# Patient Record
Sex: FEMALE | Race: WHITE | Employment: OTHER | URBAN - METROPOLITAN AREA
[De-identification: names, ages, dates, MRNs, and addresses within clinical notes are randomized per-mention and may not be internally consistent; named-entity substitution may affect disease eponyms.]

---

## 2017-09-15 ENCOUNTER — HOSPITAL ENCOUNTER (EMERGENCY)
Age: 57
Discharge: HOME | End: 2017-09-15
Attending: STUDENT IN AN ORGANIZED HEALTH CARE EDUCATION/TRAINING PROGRAM
Payer: COMMERCIAL

## 2017-09-15 ENCOUNTER — APPOINTMENT (OUTPATIENT)
Dept: GENERAL RADIOLOGY | Age: 57
End: 2017-09-15
Attending: NURSE PRACTITIONER
Payer: COMMERCIAL

## 2017-09-15 VITALS
HEART RATE: 89 BPM | OXYGEN SATURATION: 100 % | DIASTOLIC BLOOD PRESSURE: 95 MMHG | HEIGHT: 62 IN | TEMPERATURE: 97 F | RESPIRATION RATE: 17 BRPM | BODY MASS INDEX: 25.76 KG/M2 | WEIGHT: 140 LBS | SYSTOLIC BLOOD PRESSURE: 126 MMHG

## 2017-09-15 DIAGNOSIS — R07.89 ATYPICAL CHEST PAIN: Primary | ICD-10-CM

## 2017-09-15 DIAGNOSIS — I45.6 WPW (WOLFF-PARKINSON-WHITE SYNDROME): ICD-10-CM

## 2017-09-15 LAB
ALBUMIN SERPL-MCNC: 3.8 G/DL (ref 3.5–5)
ALBUMIN/GLOB SERPL: 1.1 {RATIO} (ref 1.1–2.2)
ALP SERPL-CCNC: 88 U/L (ref 45–117)
ALT SERPL-CCNC: 45 U/L (ref 12–78)
ANION GAP SERPL CALC-SCNC: 2 MMOL/L (ref 5–15)
AST SERPL-CCNC: 27 U/L (ref 15–37)
BASOPHILS # BLD: 0 K/UL (ref 0–0.1)
BASOPHILS NFR BLD: 0 % (ref 0–1)
BILIRUB SERPL-MCNC: 0.4 MG/DL (ref 0.2–1)
BUN SERPL-MCNC: 11 MG/DL (ref 6–20)
BUN/CREAT SERPL: 13 (ref 12–20)
CALCIUM SERPL-MCNC: 8.8 MG/DL (ref 8.5–10.1)
CHLORIDE SERPL-SCNC: 103 MMOL/L (ref 97–108)
CO2 SERPL-SCNC: 33 MMOL/L (ref 21–32)
CREAT SERPL-MCNC: 0.85 MG/DL (ref 0.55–1.02)
D DIMER PPP FEU-MCNC: <0.17 MG/L FEU (ref 0–0.65)
EOSINOPHIL # BLD: 0.1 K/UL (ref 0–0.4)
EOSINOPHIL NFR BLD: 2 % (ref 0–7)
ERYTHROCYTE [DISTWIDTH] IN BLOOD BY AUTOMATED COUNT: 13.2 % (ref 11.5–14.5)
GLOBULIN SER CALC-MCNC: 3.4 G/DL (ref 2–4)
GLUCOSE SERPL-MCNC: 93 MG/DL (ref 65–100)
HCT VFR BLD AUTO: 48 % (ref 35–47)
HGB BLD-MCNC: 16.2 G/DL (ref 11.5–16)
LYMPHOCYTES # BLD: 1.7 K/UL (ref 0.8–3.5)
LYMPHOCYTES NFR BLD: 22 % (ref 12–49)
MAGNESIUM SERPL-MCNC: 2.3 MG/DL (ref 1.6–2.4)
MCH RBC QN AUTO: 32.1 PG (ref 26–34)
MCHC RBC AUTO-ENTMCNC: 33.8 G/DL (ref 30–36.5)
MCV RBC AUTO: 95.2 FL (ref 80–99)
MONOCYTES # BLD: 0.5 K/UL (ref 0–1)
MONOCYTES NFR BLD: 6 % (ref 5–13)
NEUTS SEG # BLD: 5.2 K/UL (ref 1.8–8)
NEUTS SEG NFR BLD: 70 % (ref 32–75)
PLATELET # BLD AUTO: 231 K/UL (ref 150–400)
POTASSIUM SERPL-SCNC: 4.1 MMOL/L (ref 3.5–5.1)
PROT SERPL-MCNC: 7.2 G/DL (ref 6.4–8.2)
RBC # BLD AUTO: 5.04 M/UL (ref 3.8–5.2)
SODIUM SERPL-SCNC: 138 MMOL/L (ref 136–145)
TROPONIN I SERPL-MCNC: <0.04 NG/ML
TROPONIN I SERPL-MCNC: <0.04 NG/ML
WBC # BLD AUTO: 7.5 K/UL (ref 3.6–11)

## 2017-09-15 PROCEDURE — 80053 COMPREHEN METABOLIC PANEL: CPT | Performed by: NURSE PRACTITIONER

## 2017-09-15 PROCEDURE — 83735 ASSAY OF MAGNESIUM: CPT | Performed by: NURSE PRACTITIONER

## 2017-09-15 PROCEDURE — 84484 ASSAY OF TROPONIN QUANT: CPT | Performed by: NURSE PRACTITIONER

## 2017-09-15 PROCEDURE — 99285 EMERGENCY DEPT VISIT HI MDM: CPT

## 2017-09-15 PROCEDURE — 36415 COLL VENOUS BLD VENIPUNCTURE: CPT | Performed by: NURSE PRACTITIONER

## 2017-09-15 PROCEDURE — 71020 XR CHEST PA LAT: CPT

## 2017-09-15 PROCEDURE — 85025 COMPLETE CBC W/AUTO DIFF WBC: CPT | Performed by: NURSE PRACTITIONER

## 2017-09-15 PROCEDURE — 96374 THER/PROPH/DIAG INJ IV PUSH: CPT

## 2017-09-15 PROCEDURE — 85379 FIBRIN DEGRADATION QUANT: CPT | Performed by: NURSE PRACTITIONER

## 2017-09-15 PROCEDURE — 74011250636 HC RX REV CODE- 250/636: Performed by: NURSE PRACTITIONER

## 2017-09-15 RX ORDER — CITALOPRAM 20 MG/1
20 TABLET, FILM COATED ORAL DAILY
COMMUNITY

## 2017-09-15 RX ORDER — HYDROCODONE BITARTRATE AND ACETAMINOPHEN 5; 325 MG/1; MG/1
1 TABLET ORAL
Qty: 10 TAB | Refills: 0 | Status: SHIPPED | OUTPATIENT
Start: 2017-09-15 | End: 2017-09-28

## 2017-09-15 RX ORDER — SODIUM CHLORIDE 0.9 % (FLUSH) 0.9 %
5-10 SYRINGE (ML) INJECTION AS NEEDED
Status: DISCONTINUED | OUTPATIENT
Start: 2017-09-15 | End: 2017-09-15 | Stop reason: HOSPADM

## 2017-09-15 RX ORDER — LANSOPRAZOLE 30 MG/1
30 TABLET, ORALLY DISINTEGRATING, DELAYED RELEASE ORAL DAILY
COMMUNITY

## 2017-09-15 RX ORDER — MULTIVITAMIN
1 TABLET ORAL DAILY
COMMUNITY

## 2017-09-15 RX ORDER — BISMUTH SUBSALICYLATE 262 MG
1 TABLET,CHEWABLE ORAL DAILY
COMMUNITY

## 2017-09-15 RX ORDER — SIMVASTATIN 20 MG/1
20 TABLET, FILM COATED ORAL
COMMUNITY

## 2017-09-15 RX ORDER — MORPHINE SULFATE 4 MG/ML
4 INJECTION, SOLUTION INTRAMUSCULAR; INTRAVENOUS
Status: COMPLETED | OUTPATIENT
Start: 2017-09-15 | End: 2017-09-15

## 2017-09-15 RX ORDER — ONDANSETRON 2 MG/ML
4 INJECTION INTRAMUSCULAR; INTRAVENOUS
Status: DISCONTINUED | OUTPATIENT
Start: 2017-09-15 | End: 2017-09-15 | Stop reason: HOSPADM

## 2017-09-15 RX ADMIN — Medication 4 MG: at 13:52

## 2017-09-15 NOTE — ED NOTES
Pt discharged by provider. Pt escorted off the unit with a w/c and in NAD. Pt home with spouse who will be driving.

## 2017-09-15 NOTE — ED TRIAGE NOTES
4 episodes of chest pain this am.  Now experiencing pain in right jaw. Pt reports shortness of breath. Weak and tired x3 days.

## 2017-09-15 NOTE — DISCHARGE INSTRUCTIONS
Chest Pain: Care Instructions  Your Care Instructions  There are many things that can cause chest pain. Some are not serious and will get better on their own in a few days. But some kinds of chest pain need more testing and treatment. Your doctor may have recommended a follow-up visit in the next 8 to 12 hours. If you are not getting better, you may need more tests or treatment. Even though your doctor has released you, you still need to watch for any problems. The doctor carefully checked you, but sometimes problems can develop later. If you have new symptoms or if your symptoms do not get better, get medical care right away. If you have worse or different chest pain or pressure that lasts more than 5 minutes or you passed out (lost consciousness), call 911 or seek other emergency help right away. A medical visit is only one step in your treatment. Even if you feel better, you still need to do what your doctor recommends, such as going to all suggested follow-up appointments and taking medicines exactly as directed. This will help you recover and help prevent future problems. How can you care for yourself at home? · Rest until you feel better. · Take your medicine exactly as prescribed. Call your doctor if you think you are having a problem with your medicine. · Do not drive after taking a prescription pain medicine. When should you call for help? Call 911 if:  · You passed out (lost consciousness). · You have severe difficulty breathing. · You have symptoms of a heart attack. These may include:  ¨ Chest pain or pressure, or a strange feeling in your chest.  ¨ Sweating. ¨ Shortness of breath. ¨ Nausea or vomiting. ¨ Pain, pressure, or a strange feeling in your back, neck, jaw, or upper belly or in one or both shoulders or arms. ¨ Lightheadedness or sudden weakness. ¨ A fast or irregular heartbeat.   After you call 911, the  may tell you to chew 1 adult-strength or 2 to 4 low-dose aspirin. Wait for an ambulance. Do not try to drive yourself. Call your doctor today if:  · You have any trouble breathing. · Your chest pain gets worse. · You are dizzy or lightheaded, or you feel like you may faint. · You are not getting better as expected. · You are having new or different chest pain. Where can you learn more? Go to http://justine-brody.info/. Enter A120 in the search box to learn more about \"Chest Pain: Care Instructions. \"  Current as of: March 20, 2017  Content Version: 11.3  © 3997-0144 [employer]. Care instructions adapted under license by [employer] (which disclaims liability or warranty for this information). If you have questions about a medical condition or this instruction, always ask your healthcare professional. [address] any warranty or liability for your use of this information. Giulia-Parkinson-White (WPW) Syndrome: Care Instructions  Your Care Instructions    Giulia-Parkinson-White (WPW) syndrome is a heart rhythm problem that causes a very fast heart rate. It happens because you have an extra electrical pathway in your heart. WPW is a congenital heart problem. This means you were born with the problem. You may have a fast heart rate or feel a fluttering in your chest (palpitations), feel lightheaded or dizzy, or faint. When you have these symptoms, it is called an episode. You may never have an episode, rarely have one, or have one once or twice a week. Very rarely, a WPW episode can trigger a heart rhythm that can cause death. Your doctor may prescribe medicines to help slow down your heartbeat. Your doctor may also suggest you try vagal maneuvers when having an episode of WPW. These are things, like bearing down, that might help slow your heart rate. Bearing down means that you try to breathe out with your stomach muscles but you don't let air out of your nose or mouth.  Your doctor can show you how to do vagal maneuvers. He or she may suggest that you lie down on your back to do them. In some cases, a procedure called catheter ablation is done. Follow-up care is a key part of your treatment and safety. Be sure to make and go to all appointments, and call your doctor if you are having problems. It's also a good idea to know your test results and keep a list of the medicines you take. How can you care for yourself at home? · Take your medicines exactly as prescribed. Call your doctor if you think you are having a problem with your medicine. You will get more details on the specific medicines your doctor prescribes. · If your doctor showed you how to do vagal maneuvers, try them when you have an episode. These maneuvers include bearing down or putting an ice-cold, wet towel on your face. · Monitor your condition by keeping a diary of your WPW episodes. Bring this to your doctor appointments. First, you'll need to count your heart rate (take your pulse). · After you check your heart rate, write down:  ¨ How fast or slow your heart was beating. ¨ If your heart rhythm was regular or irregular. ¨ What symptoms you had. ¨ The time of day your symptoms occurred. ¨ How long your symptoms lasted. ¨ What you were doing when your symptoms started. ¨ What may have helped your symptoms go away. · If they trigger episodes, limit or avoid alcohol or drinks with caffeine. · Do not use over-the-counter decongestants, diet pills, or \"pep\" pills. They often contain ingredients that make your heart beat faster (stimulants). · Do not use illegal drugs, such as cocaine, ecstasy, or methamphetamine, which can speed up your heart's rhythm. · Do not smoke. Smoking can make this condition worse. If you need help quitting, talk to your doctor about stop-smoking programs and medicines. These can increase your chances of quitting for good. When should you call for help?   Call 911anytime you think you may need emergency care. For example, call if:  · You passed out (lost consciousness). · You have fluttering in your chest (palpitations) or a fast heartbeat that does not stop quickly. · You have symptoms of a heart attack. These may include:  ¨ Chest pain or pressure, or a strange feeling in the chest.  ¨ Sweating. ¨ Shortness of breath. ¨ Nausea or vomiting. ¨ Pain, pressure, or a strange feeling in the back, neck, jaw, or upper belly or in one or both shoulders or arms. ¨ Lightheadedness or a sudden weakness. ¨ A fast or irregular heartbeat. After you call 911, the  may tell you to chew 1 adult-strength or 2 to 4 low-dose aspirin. Wait for an ambulance. Do not try to drive yourself. Call your doctor now or seek immediate medical care if:  · You had fluttering in the chest (palpitations) or a fast heartbeat that stopped on its own. · You are dizzy or lightheaded, or you feel like you may faint. Watch closely for changes in your health, and be sure to contact your doctor if:  · You do not get better as expected. Where can you learn more? Go to http://justine-brody.info/. Enter B246 in the search box to learn more about \"Giulia-Parkinson-White (WPW) Syndrome: Care Instructions. \"  Current as of: April 26, 2016  Content Version: 11.3  © 6512-0336 [employer]. Care instructions adapted under license by [employer] (which disclaims liability or warranty for this information). If you have questions about a medical condition or this instruction, always ask your healthcare professional. [place] [address] any warranty or liability for your use of this information. We hope that we have addressed all of your medical concerns. The examination and treatment you received in the Emergency Department were for an emergent problem and were not intended as complete care. It is important that you follow up with your healthcare provider(s) for ongoing care.  If your symptoms worsen or do not improve as expected, and you are unable to reach your usual health care provider(s), you should return to the Emergency Department. Today's healthcare is undergoing tremendous change, and patient satisfaction surveys are one of the many tools to assess the quality of medical care. You may receive a survey from the [facility] regarding your experience in the Emergency Department. I hope that your experience has been completely positive, particularly the medical care that I provided. As such, please participate in the survey; anything less than excellent does not meet my expectations or intentions. [facility] and [employer] participate in nationally recognized quality of care measures. If your blood pressure is greater than 120/80, as reported below, we urge that you seek medical care to address the potential of high blood pressure, commonly known as hypertension. Hypertension can be hereditary or can be caused by certain medical conditions, pain, stress, or \"white coat syndrome. \"       Please make an appointment with your health care provider(s) for follow up of your Emergency Department visit. VITALS:   Patient Vitals for the past 8 hrs:   Temp Pulse Resp BP SpO2   09/15/17 1330 - 79 19 (!) 126/96 97 %   09/15/17 1324 - 80 19 (!) 120/93 97 %   09/15/17 1230 - 85 18 (!) 127/95 98 %   09/15/17 1200 - 84 17 121/87 99 %   09/15/17 1145 - 85 16 116/84 97 %   09/15/17 1130 - 86 21 (!) 128/96 99 %   09/15/17 1115 - 89 12 (!) 137/93 99 %   09/15/17 1059 97 °F (36.1 °C) 92 20 (!) 143/94 99 %          Thank you for allowing us to provide you with medical care today. We realize that you have many choices for your emergency care needs. Please choose us in the future for any continued health care needs. Wally Hartman, [address] [facility].   Office: [phone]            Recent Results (from the past 24 hour(s))   METABOLIC PANEL, COMPREHENSIVE    Collection Time: 09/15/17 11:17 AM   Result Value Ref Range    Sodium 138 136 - 145 mmol/L    Potassium 4.1 3.5 - 5.1 mmol/L    Chloride 103 97 - 108 mmol/L    CO2 33 (H) 21 - 32 mmol/L    Anion gap 2 (L) 5 - 15 mmol/L    Glucose 93 65 - 100 mg/dL    BUN 11 6 - 20 MG/DL    Creatinine 0.85 0.55 - 1.02 MG/DL    BUN/Creatinine ratio 13 12 - 20      GFR est AA >60 >60 ml/min/1.73m2    GFR est non-AA >60 >60 ml/min/1.73m2    Calcium 8.8 8.5 - 10.1 MG/DL    Bilirubin, total 0.4 0.2 - 1.0 MG/DL    ALT (SGPT) 45 12 - 78 U/L    AST (SGOT) 27 15 - 37 U/L    Alk. phosphatase 88 45 - 117 U/L    Protein, total 7.2 6.4 - 8.2 g/dL    Albumin 3.8 3.5 - 5.0 g/dL    Globulin 3.4 2.0 - 4.0 g/dL    A-G Ratio 1.1 1.1 - 2.2     CBC WITH AUTOMATED DIFF    Collection Time: 09/15/17 11:17 AM   Result Value Ref Range    WBC 7.5 3.6 - 11.0 K/uL    RBC 5.04 3.80 - 5.20 M/uL    HGB 16.2 (H) 11.5 - 16.0 g/dL    HCT 48.0 (H) 35.0 - 47.0 %    MCV 95.2 80.0 - 99.0 FL    MCH 32.1 26.0 - 34.0 PG    MCHC 33.8 30.0 - 36.5 g/dL    RDW 13.2 11.5 - 14.5 %    PLATELET 573 132 - 100 K/uL    NEUTROPHILS 70 32 - 75 %    LYMPHOCYTES 22 12 - 49 %    MONOCYTES 6 5 - 13 %    EOSINOPHILS 2 0 - 7 %    BASOPHILS 0 0 - 1 %    ABS. NEUTROPHILS 5.2 1.8 - 8.0 K/UL    ABS. LYMPHOCYTES 1.7 0.8 - 3.5 K/UL    ABS. MONOCYTES 0.5 0.0 - 1.0 K/UL    ABS. EOSINOPHILS 0.1 0.0 - 0.4 K/UL    ABS.  BASOPHILS 0.0 0.0 - 0.1 K/UL   TROPONIN I    Collection Time: 09/15/17 11:17 AM   Result Value Ref Range    Troponin-I, Qt. <0.04 <0.05 ng/mL   MAGNESIUM    Collection Time: 09/15/17 11:17 AM   Result Value Ref Range    Magnesium 2.3 1.6 - 2.4 mg/dL   D DIMER    Collection Time: 09/15/17 11:17 AM   Result Value Ref Range    D-dimer <0.17 0.00 - 0.65 mg/L FEU   TROPONIN I    Collection Time: 09/15/17  1:22 PM   Result Value Ref Range    Troponin-I, Qt. <0.04 <0.05 ng/mL       Xr Chest Pa Lat    Result Date: 9/15/2017  Chest PA and lateral History: Chest pain. Pain in right jaw. Comparison: None Findings: The lungs are well expanded. A calcified granuloma is seen along the left hemidiaphragm. No focal consolidation, pleural effusion, or pneumothorax. The cardiomediastinal silhouette is unremarkable. The visualized osseous structures are unremarkable. Impression: No acute cardiopulmonary process.

## 2017-09-15 NOTE — ED PROVIDER NOTES
HPI Comments: The patient is a 56-year-old female who presents ambulatory to the emergency room with chest pain for the last 4 hours. History of WPW diagnosed 10 years ago in the emergency room although this was never followed up. Chest pain began spontaneously substernally. It is not exacerbated by pain. It is not associated with diaphoresis or shortness of breath. Non exertional chest pain. Patient reports breathing quickly as she is panicked. History of significant anxiety. History of GERD on PPI. Pt denies fevers, chills, night sweats, SOB, FOX, PND, orthopnea, abdominal pain, n/v/d, melena, hematuria, dysuria, constipation, HA, dizziness, and syncope      Past Medical History:  No date: Depression  No date: gerd  No date: Hypercholesteremia  No date: Giulia-Parkinson-White (WPW) syndrome    Past Surgical History:  No date: HX APPENDECTOMY  No date: HX HYSTERECTOMY    PCP:  Randy Paul MD      Patient is a 65 y.o. female presenting with chest pain. The history is provided by the patient. Chest Pain (Angina)    This is a new problem. Pertinent negatives include no abdominal pain, no back pain, no cough, no diaphoresis, no dizziness, no fever, no headaches, no nausea, no palpitations, no shortness of breath, no vomiting and no weakness. Past Medical History:   Diagnosis Date    Depression     gerd     Hypercholesteremia     Giulia-Parkinson-White (WPW) syndrome        Past Surgical History:   Procedure Laterality Date    HX APPENDECTOMY      HX HYSTERECTOMY           History reviewed. No pertinent family history. Social History     Social History    Marital status:      Spouse name: N/A    Number of children: N/A    Years of education: N/A     Occupational History    Not on file.      Social History Main Topics    Smoking status: Former Smoker    Smokeless tobacco: Never Used    Alcohol use 1.2 oz/week     2 Glasses of wine per week    Drug use: No    Sexual activity: Not on file     Other Topics Concern    Not on file     Social History Narrative    No narrative on file         ALLERGIES: Review of patient's allergies indicates no known allergies. Review of Systems   Constitutional: Negative for activity change, appetite change, chills, diaphoresis, fatigue, fever and unexpected weight change. HENT: Negative for congestion, ear pain, rhinorrhea, sinus pressure, sore throat and tinnitus. Eyes: Negative for photophobia, pain, discharge and visual disturbance. Respiratory: Negative for apnea, cough, choking, chest tightness, shortness of breath, wheezing and stridor. Cardiovascular: Positive for chest pain. Negative for palpitations and leg swelling. Gastrointestinal: Negative for abdominal pain, constipation, diarrhea, nausea and vomiting. Endocrine: Negative for polydipsia, polyphagia and polyuria. Genitourinary: Negative for decreased urine volume, dyspareunia, dysuria, enuresis, flank pain, frequency, hematuria and urgency. Musculoskeletal: Negative for arthralgias, back pain, gait problem, myalgias and neck pain. Skin: Negative for color change, pallor, rash and wound. Allergic/Immunologic: Negative for immunocompromised state. Neurological: Negative for dizziness, seizures, syncope, weakness, light-headedness and headaches. Hematological: Does not bruise/bleed easily. Psychiatric/Behavioral: Negative for agitation and confusion. The patient is not nervous/anxious. Vitals:    09/15/17 1230 09/15/17 1324 09/15/17 1330 09/15/17 1400   BP: (!) 127/95 (!) 120/93 (!) 126/96 (!) 126/95   Pulse: 85 80 79 89   Resp: 18 19 19 17   Temp:       SpO2: 98% 97% 97% 100%   Weight:       Height:                Physical Exam   Constitutional: She is oriented to person, place, and time. She appears well-developed and well-nourished. No distress. HENT:   Head: Normocephalic.    Right Ear: External ear normal.   Left Ear: External ear normal.   Mouth/Throat: Oropharynx is clear and moist. No oropharyngeal exudate. Eyes: Conjunctivae and EOM are normal. Pupils are equal, round, and reactive to light. Right eye exhibits no discharge. Left eye exhibits no discharge. No scleral icterus. Neck: Normal range of motion. Neck supple. No JVD present. No tracheal deviation present. No thyromegaly present. Cardiovascular: Normal rate, regular rhythm, normal heart sounds, intact distal pulses and normal pulses. PMI is not displaced. Exam reveals no gallop and no friction rub. No murmur heard. Pulmonary/Chest: Effort normal and breath sounds normal. No accessory muscle usage or stridor. No respiratory distress. She has no decreased breath sounds. She has no wheezes. She has no rhonchi. She has no rales. She exhibits no tenderness. Abdominal: Soft. Bowel sounds are normal. She exhibits no distension and no mass. There is no hepatosplenomegaly. There is no tenderness. There is no rigidity, no rebound, no guarding, no CVA tenderness, no tenderness at McBurney's point and negative Miguel's sign. Musculoskeletal: Normal range of motion. She exhibits no edema or tenderness. Lymphadenopathy:     She has no cervical adenopathy. Neurological: She is alert and oriented to person, place, and time. She has normal strength. She displays normal reflexes. No cranial nerve deficit or sensory deficit. GCS eye subscore is 4. GCS verbal subscore is 5. GCS motor subscore is 6. Skin: Skin is warm and dry. No rash noted. She is not diaphoretic. No erythema. No pallor. Psychiatric: She has a normal mood and affect. Her behavior is normal. Judgment and thought content normal.   Nursing note and vitals reviewed.        MDM  Number of Diagnoses or Management Options  Diagnosis management comments:    * routine laboratory data    * CXR   * Analgesia          Amount and/or Complexity of Data Reviewed  Clinical lab tests: ordered and reviewed  Tests in the radiology section of CPT®: ordered and reviewed  Discussion of test results with the performing providers: yes  Review and summarize past medical records: yes  Discuss the patient with other providers: yes    Risk of Complications, Morbidity, and/or Mortality  General comments:    - stable, ambulatory pt in NAD    Patient Progress  Patient progress: stable    ED Course       Procedures      ED EKG interpretation:  Rhythm: normal sinus rhythm; and regular . Rate (approx.): 91; Axis: normal; P wave: normal; QRS interval: normal ; ST/T wave: normal; in  Lead: WPW. This EKG was interpreted by Celestine Matos NP,ED Provider. 5:02 PM  Pt has been reevaluated. There are no new complaints, changes, or physical findings at this time. Medications have been reviewed w/ pt and/or family. Pt and/or family's questions have been answered. Pt and/or family expressed good understanding of the dx/tx/rx and is in agreement with plan of care. Pt instructed and agreed to f/u w/ PCP and Cardiology and to return to ED upon further deterioration. Pt is ready for discharge. LABORATORY TESTS:  Recent Results (from the past 12 hour(s))   METABOLIC PANEL, COMPREHENSIVE    Collection Time: 09/15/17 11:17 AM   Result Value Ref Range    Sodium 138 136 - 145 mmol/L    Potassium 4.1 3.5 - 5.1 mmol/L    Chloride 103 97 - 108 mmol/L    CO2 33 (H) 21 - 32 mmol/L    Anion gap 2 (L) 5 - 15 mmol/L    Glucose 93 65 - 100 mg/dL    BUN 11 6 - 20 MG/DL    Creatinine 0.85 0.55 - 1.02 MG/DL    BUN/Creatinine ratio 13 12 - 20      GFR est AA >60 >60 ml/min/1.73m2    GFR est non-AA >60 >60 ml/min/1.73m2    Calcium 8.8 8.5 - 10.1 MG/DL    Bilirubin, total 0.4 0.2 - 1.0 MG/DL    ALT (SGPT) 45 12 - 78 U/L    AST (SGOT) 27 15 - 37 U/L    Alk.  phosphatase 88 45 - 117 U/L    Protein, total 7.2 6.4 - 8.2 g/dL    Albumin 3.8 3.5 - 5.0 g/dL    Globulin 3.4 2.0 - 4.0 g/dL    A-G Ratio 1.1 1.1 - 2.2     CBC WITH AUTOMATED DIFF    Collection Time: 09/15/17 11:17 AM   Result Value Ref Range    WBC 7.5 3.6 - 11.0 K/uL    RBC 5.04 3.80 - 5.20 M/uL    HGB 16.2 (H) 11.5 - 16.0 g/dL    HCT 48.0 (H) 35.0 - 47.0 %    MCV 95.2 80.0 - 99.0 FL    MCH 32.1 26.0 - 34.0 PG    MCHC 33.8 30.0 - 36.5 g/dL    RDW 13.2 11.5 - 14.5 %    PLATELET 092 742 - 377 K/uL    NEUTROPHILS 70 32 - 75 %    LYMPHOCYTES 22 12 - 49 %    MONOCYTES 6 5 - 13 %    EOSINOPHILS 2 0 - 7 %    BASOPHILS 0 0 - 1 %    ABS. NEUTROPHILS 5.2 1.8 - 8.0 K/UL    ABS. LYMPHOCYTES 1.7 0.8 - 3.5 K/UL    ABS. MONOCYTES 0.5 0.0 - 1.0 K/UL    ABS. EOSINOPHILS 0.1 0.0 - 0.4 K/UL    ABS. BASOPHILS 0.0 0.0 - 0.1 K/UL   TROPONIN I    Collection Time: 09/15/17 11:17 AM   Result Value Ref Range    Troponin-I, Qt. <0.04 <0.05 ng/mL   MAGNESIUM    Collection Time: 09/15/17 11:17 AM   Result Value Ref Range    Magnesium 2.3 1.6 - 2.4 mg/dL   D DIMER    Collection Time: 09/15/17 11:17 AM   Result Value Ref Range    D-dimer <0.17 0.00 - 0.65 mg/L FEU   TROPONIN I    Collection Time: 09/15/17  1:22 PM   Result Value Ref Range    Troponin-I, Qt. <0.04 <0.05 ng/mL       IMAGING RESULTS:  XR CHEST PA LAT   Final Result        Xr Chest Pa Lat    Result Date: 9/15/2017  Chest PA and lateral History: Chest pain. Pain in right jaw. Comparison: None Findings: The lungs are well expanded. A calcified granuloma is seen along the left hemidiaphragm. No focal consolidation, pleural effusion, or pneumothorax. The cardiomediastinal silhouette is unremarkable. The visualized osseous structures are unremarkable. Impression: No acute cardiopulmonary process. MEDICATIONS GIVEN:  Medications   sodium chloride (NS) flush 5-10 mL (not administered)   ondansetron (ZOFRAN) injection 4 mg (4 mg IntraVENous Refused 9/15/17 1040)   morphine injection 4 mg (4 mg IntraVENous Given 9/15/17 1352)       IMPRESSION:  1. Atypical chest pain    2. WPW (Giulia-Parkinson-White syndrome)        PLAN:  1.    Discharge Medication List as of 9/15/2017  2:15 PM      START taking these medications    Details   HYDROcodone-acetaminophen (NORCO) 5-325 mg per tablet Take 1 Tab by mouth every four (4) hours as needed for Pain. Max Daily Amount: 6 Tabs., Print, Disp-10 Tab, R-0         CONTINUE these medications which have NOT CHANGED    Details   simvastatin (ZOCOR) 20 mg tablet Take 20 mg by mouth nightly., Historical Med      citalopram (CELEXA) 20 mg tablet Take 20 mg by mouth daily. , Historical Med      lansoprazole (PREVACID SOLUTAB) 30 mg disintegrating tablet Take 30 mg by mouth daily. , Historical Med      multivitamin (ONE A DAY) tablet Take 1 Tab by mouth daily. , Historical Med      calcium-cholecalciferol, D3, (CALTRATE 600+D) tablet Take 1 Tab by mouth daily. , Historical Med           2. Follow-up Information     Follow up With Details Comments Contact Info    Juan Jose Cotton MD In 3 days  [address] [place] [address] [facility] Maria Ines Mujica MD In 3 days  [address] [place] [address]  [phone]      [facility] EMERGENCY DEPT  As needed, If symptoms worsen [address] [facility]  [phone]        3.  Supportive care     Return to ED if worse       Cassie Fishman NP  5:02 PM

## 2017-09-15 NOTE — ED NOTES
Pt is holding pressure to sternum. Also c/o headache developing. Declined morphine. States she does not like it. Denies nausea. States if headache gets worse she may take morphine.

## 2017-09-28 ENCOUNTER — OFFICE VISIT (OUTPATIENT)
Dept: CARDIOLOGY CLINIC | Age: 57
End: 2017-09-28

## 2017-09-28 VITALS
BODY MASS INDEX: 26.46 KG/M2 | SYSTOLIC BLOOD PRESSURE: 134 MMHG | HEIGHT: 62 IN | RESPIRATION RATE: 16 BRPM | DIASTOLIC BLOOD PRESSURE: 90 MMHG | WEIGHT: 143.8 LBS | HEART RATE: 84 BPM | OXYGEN SATURATION: 99 %

## 2017-09-28 DIAGNOSIS — R07.9 CHEST PAIN, UNSPECIFIED TYPE: Primary | ICD-10-CM

## 2017-09-28 DIAGNOSIS — I45.6 WOLFF-PARKINSON-WHITE SYNDROME: ICD-10-CM

## 2017-09-28 DIAGNOSIS — R00.2 PALPITATIONS: ICD-10-CM

## 2017-09-28 RX ORDER — CYCLOBENZAPRINE HCL 10 MG
TABLET ORAL AS NEEDED
COMMUNITY

## 2017-09-28 NOTE — MR AVS SNAPSHOT
Visit Information Date & Time Provider Department Dept. Phone Encounter #  
 9/28/2017 10:40 AM Alley Mcgraw MD [facility] Sheryle Pesa [phone] [id number] Upcoming Health Maintenance Date Due Hepatitis C Screening 1960 DTaP/Tdap/Td series (1 - Tdap) 1/26/1982 PAP AKA CERVICAL CYTOLOGY 1/26/1982 BREAST CANCER SCRN MAMMOGRAM 1/26/2011 FOBT Q 1 YEAR AGE 50-75 1/26/2011 INFLUENZA AGE 9 TO ADULT 8/1/2017 Allergies as of 9/28/2017  Review Complete On: 9/28/2017 By: Jair Stone No Known Allergies Current Immunizations  Never Reviewed No immunizations on file. Not reviewed this visit Vitals BP Pulse Resp Height(growth percentile) Weight(growth percentile) SpO2  
 134/90 (BP 1 Location: Left arm) 84 16 5' 2\" (1.575 m) 143 lb 12.8 oz (65.2 kg) 99% BMI OB Status Smoking Status 26.3 kg/m2 Hysterectomy Former Smoker Vitals History BMI and BSA Data Body Mass Index Body Surface Area  
 26.3 kg/m 2 1.69 m 2 Your Updated Medication List  
  
   
This list is accurate as of: 9/28/17 11:29 AM.  Always use your most recent med list.  
  
  
  
  
 calcium-cholecalciferol (D3) tablet Commonly known as:  CALTRATE 600+D Take 1 Tab by mouth daily. citalopram 20 mg tablet Commonly known as:  Monna Roch Take 20 mg by mouth daily. cyclobenzaprine 10 mg tablet Commonly known as:  FLEXERIL Take  by mouth as needed for Muscle Spasm(s).  
  
 lansoprazole 30 mg disintegrating tablet Commonly known as:  Henrey Cue Take 30 mg by mouth daily. multivitamin tablet Commonly known as:  ONE A DAY Take 1 Tab by mouth daily. simvastatin 20 mg tablet Commonly known as:  ZOCOR Take 20 mg by mouth nightly. Introducing [facility]!    
 [facility] introduces [employer] patient portal. Now you can access parts of your medical record, email your doctor's office, and request medication refills online. 1. In your internet browser, go to [email or web address] 2. Click on the First Time User? Click Here link in the Sign In box. You will see the New Member Sign Up page. 3. Enter your [facility] Access Code exactly as it appears below. You will not need to use this code after youve completed the sign-up process. If you do not sign up before the expiration date, you must request a new code. · [facility] Access Code: [email or web address] Expires: 12/14/2017  2:15 PM 
 
4. Enter the last four digits of your Social Security Number (xxxx) and Date of Birth (mm/dd/yyyy) as indicated and click Submit. You will be taken to the next sign-up page. 5. Create a [facility] ID. This will be your [facility] login ID and cannot be changed, so think of one that is secure and easy to remember. 6. Create a [facility] password. You can change your password at any time. 7. Enter your Password Reset Question and Answer. This can be used at a later time if you forget your password. 8. Enter your e-mail address. You will receive e-mail notification when new information is available in [address] 9. Click Sign Up. You can now view and download portions of your medical record. 10. Click the Download Summary menu link to download a portable copy of your medical information. If you have questions, please visit the Frequently Asked Questions section of the [facility] website. Remember, [facility] is NOT to be used for urgent needs. For medical emergencies, dial 911. Now available from your iPhone and Android! Please provide this summary of care documentation to your next provider. Your primary care clinician is listed as Kamran Sanchez. If you have any questions after today's visit, please call [phone].

## 2017-09-28 NOTE — LETTER
9/28/2017 11:31 AM 
 
Patient:  Celestino aWy YOB: 1960 Date of Visit: 9/28/2017 Dear Dr. Melodie Samuels MD 
[address] [place] [address] [facility] [address] VIA Facsimile: [phone] 
 : Thank you for referring Ms. Celestino Way to me for evaluation/treatment. Below are the relevant portions of my assessment and plan of care. Dear Dr Isabel Bello, 
 
I had the pleasure of seeing Ms Celestino Way in the office today. Assessment: 
Recent ED visit for palpitations/chest pain. History of Leal Balm White syndrome diagnosed in 1995. No symptoms until recently. In 1995 she was having palpitations and was told that she had Giulia-Parkinson-White syndrome. Depression GERD Plan:  
Echocardiogram. 
Stress nuclear study. Referral to  -explore options regarding her Giulia-Parkinson-White syndrome Consider checking TSH if not checked recently. Follow-up with me in 6 months or earlier based on cardiac workup. If you have questions, please do not hesitate to call me. I look forward to following Ms. Mychal Epstein along with you. Sincerely, Ren Mayo MD

## 2017-09-28 NOTE — PROGRESS NOTES
Vanessa Kolb MD    [address] [place], [address] [facility]    Office [phone],[facility] [place], [place] [phone]  Pager [phone] Deisi Prabhakar is a 65 y.o. female referred for evaluation of palpitations . Consult requested by Candy Andrade MD        Primary care physician:  Candy Andrade MD  Chief complaint:  Mattie Green is a 65 y.o. female who complains of   Chief Complaint   Patient presents with   Al Mullins     Dear Dr Justin Cohen,    I had the pleasure of seeing Ms Mattie Green in the office today. Assessment:  Recent ED visit for palpitations/chest pain. History of Esdras Presume White syndrome diagnosed in 1995. No symptoms until recently. In 1995 she was having palpitations and was told that she had Giulia-Parkinson-White syndrome. Depression  GERD    Plan:   Echocardiogram.  Stress nuclear study. Referral to  -explore options regarding her Giulia-Parkinson-White syndrome  Consider checking TSH if not checked recently. Follow-up with me in 6 months or earlier based on cardiac workup. Patient understands the plan. All questions were answered to the patient's satisfaction. Medication Side Effects and Warnings were discussed with patient: yes  Patient Labs were reviewed and or requested:  yes  Patient Past Records were reviewed and or requested: yes    I appreciate the opportunity to be involved in Ms. Venegas. Please see note below for details. Please do not hesitate to contact us with questions or concerns. Vanessa Kolb MD    Cardiac Testing/ Procedures: A. Cardiac Cath/PCI:    B.ECHO/HAYDER:    C.StressNuclear/Stress ECHO/Stress test:    D.Vascular:    E. EP:    F. Miscellaneous:    History of present illness:    Patient is a pleasant 52-year-old  lady with history of Giulia-Parkinson-White syndrome who recently went to the emergency room for chest pain. Chest pain occurred while she was sitting at her desk.   It was associated with palpitations. Dizziness present. No syncope. In the ED her heart rate was within normal limits and she was advised to follow with cardiology. No recurrent symptoms since the ED visit. No swelling lower extremities. Has significant stressors at [employer] in the [employer]. History of Shai Riedel White syndrome diagnosed in 1995. No symptoms until recently. In 1995 she was having palpitations and was told that she had Giulia-Parkinson-White syndrome. Past Medical History:   Diagnosis Date    Depression     gerd     Hypercholesteremia     Giulia-Parkinson-White (WPW) syndrome       Past Surgical History:   Procedure Laterality Date    HX APPENDECTOMY      HX HYSTERECTOMY       Family History   Problem Relation Age of Onset    Coronary Artery Disease Mother      Stent      Social History   Substance Use Topics    Smoking status: Former Smoker    Smokeless tobacco: Never Used      Comment: Quit 2 months ago; 40+ years of smoking    Alcohol use 1.2 oz/week     2 Glasses of wine per week          Medications before admission:    Current Outpatient Prescriptions   Medication Sig Dispense    cyclobenzaprine (FLEXERIL) 10 mg tablet Take  by mouth as needed for Muscle Spasm(s).  simvastatin (ZOCOR) 20 mg tablet Take 20 mg by mouth nightly.  citalopram (CELEXA) 20 mg tablet Take 20 mg by mouth daily.  lansoprazole (PREVACID SOLUTAB) 30 mg disintegrating tablet Take 30 mg by mouth daily.  multivitamin (ONE A DAY) tablet Take 1 Tab by mouth daily.  calcium-cholecalciferol, D3, (CALTRATE 600+D) tablet Take 1 Tab by mouth daily. No current facility-administered medications for this visit.             Review of Systems:  (bold if positive, if negative)    Gen:   fatigueEyes:  ENT:  CVS:  Pulm:  GI:    :    MS:  Skin:  Psych:  depression, anxietyEndo:    Hem:  Renal:    Neuro:        Physical Exam:  Visit Vitals    /90 (BP 1 Location: Left arm)    Pulse 84  Resp 16    Ht 5' 2\" (1.575 m)    Wt 143 lb 12.8 oz (65.2 kg)    SpO2 99%    BMI 26.3 kg/m2          Gen: Well-developed, well-nourished, in no acute distress  HEENT:  Pink conjunctivae, hearing intact to voice, moist mucous membranes  Neck: Supple,No JVD, No Carotid Bruit, Thyroid- non tender  Resp: No accessory muscle use, Clear breath sounds, No rales or rhonchi  Card: Regular Rate,Rythm,Normal S1, S2, No murmurs, rubs or gallop. No thrills. Abd:  Soft, non-tender, non-distended, normoactive bowel sounds are present,   MSK: No cyanosis or clubbing  Skin: No rashes or ulcers  Neuro:   moving all four extremities, no focal deficit, follows commands appropriately  Psych:  Good insight, oriented to person, place and time, alert, Nml Affect  LE: No edema  Vascular: Radial Pulses 2+ and symmetric        EKG:  NSR/Delta waves/Galarza parkinson white      Cxray:    LABS:    No results for input(s): CPK, TROIQ in the last 72 hours.     No lab exists for component: CKQMB, CPKMB    Lab Results   Component Value Date/Time    WBC 7.5 09/15/2017 11:17 AM    HGB 16.2 09/15/2017 11:17 AM    HCT 48.0 09/15/2017 11:17 AM    PLATELET 091 45/18/8817 11:17 AM    MCV 95.2 09/15/2017 11:17 AM     Lab Results   Component Value Date/Time    Sodium 138 09/15/2017 11:17 AM    Potassium 4.1 09/15/2017 11:17 AM    Chloride 103 09/15/2017 11:17 AM    CO2 33 09/15/2017 11:17 AM    Anion gap 2 09/15/2017 11:17 AM    Glucose 93 09/15/2017 11:17 AM    BUN 11 09/15/2017 11:17 AM    Creatinine 0.85 09/15/2017 11:17 AM    BUN/Creatinine ratio 13 09/15/2017 11:17 AM    GFR est AA >60 09/15/2017 11:17 AM    GFR est non-AA >60 09/15/2017 11:17 AM    Calcium 8.8 09/15/2017 11:17 AM             Janel Grant MD

## 2017-11-13 ENCOUNTER — OFFICE VISIT (OUTPATIENT)
Dept: CARDIOLOGY CLINIC | Age: 57
End: 2017-11-13

## 2017-11-13 VITALS
HEIGHT: 62 IN | RESPIRATION RATE: 16 BRPM | BODY MASS INDEX: 27.9 KG/M2 | OXYGEN SATURATION: 98 % | DIASTOLIC BLOOD PRESSURE: 82 MMHG | HEART RATE: 80 BPM | SYSTOLIC BLOOD PRESSURE: 126 MMHG | WEIGHT: 151.6 LBS

## 2017-11-13 DIAGNOSIS — I45.6 WOLFF-PARKINSON-WHITE SYNDROME: ICD-10-CM

## 2017-11-13 DIAGNOSIS — R00.2 PALPITATIONS: Primary | ICD-10-CM

## 2017-11-13 NOTE — PATIENT INSTRUCTIONS
You are scheduled for a implantable cardiac monitor at [facility] on Wednesday, December 6th. Please arrive at the patient registration located on the 2nd floor of the main building at 11:00 am.    Do not eat or drink anything after midnight the night before your procedure. You will need a . You may take your normal medications with a sip of water the morning of your procedure. Lab instructions: Please have labs drawn 5-10 days prior to scheduled procedure. Please call Kathy Tejada or Van November if you have any questions at [phone]. Please call the office if you develop any type of illness prior to your procedure. Learning About Implantable Heart Monitors  What is an implantable heart monitor? An implantable heart monitor is a small device placed under the skin of your chest. It records the electrical signals from your heart. A monitor is used to look for irregular heartbeats. It can help your doctor find out what is causing your fainting, lightheadedness, or other symptoms. It also can help your doctor check to see if treatment for an irregular heartbeat is working. The monitor may be placed near your collarbone or near the middle of your chest. Where it's placed depends on the size and type of device. The monitor may be about the size of a small pack of chewing gum or a paper clip. These monitors are used if an irregular heart rhythm or your symptoms don't happen very often. They also help your doctor monitor your heart for a long time. Implantable heart monitors are safe to use. No electricity is sent through your body. So there is no chance of getting an electric shock. How is the monitor put in place? The monitor is put in during a short surgery. You will get medicine to numb the area of your chest where the monitor will be put in. You will be awake during the surgery, but you shouldn't feel any pain.   Your doctor will make a small cut and place the monitor under your skin. Then he or she will close the cut with special tape or glue or with stitches that will dissolve. Then the doctor will place a bandage over the cut. The procedure will take about half an hour. You probably will be able to go home soon after it's done. You may have some minor pain where the cut was made. You will get instructions from your doctor on how to care for it at home. When your doctor says it's safe, you should be able to get back to your normal activities. What can you expect when you have a monitor? Your monitor may start recording on its own when it detects an abnormal heartbeat. Or you might use a handheld device to start the monitor when you have symptoms. Your doctor will explain which type of monitor you have and what you need to do. Your doctor will tell you how often he or she will need to check your monitor. The information from your monitor may be sent to your doctor automatically. Or you may have to do something to send it. It may be sent over a phone line or through the internet. You will get instructions from your doctor. Your information will stay private and secure. You will also have checkups in person. You may need to keep a symptom diary while you have the monitor. This means that you will write down the times you have symptoms and what you were doing when they started. Your doctor will let you know how to do this. He or she can then look at your heart monitor records to see if your heart rhythms changed when you had symptoms. You may have the monitor for months or even years. It depends on how long it takes to record irregular heartbeat episodes. It also depends on how long your doctor wants to monitor your heart. After your doctor gets the information he or she needs, the monitor will be removed from your chest.  What else should you know about living with a heart monitor? You will get a medical ID card with information about your heart monitor.  Keep it with you at all times. You can safely use most household and office electronics such as kitchen appliances, electric power tools, and computers. You will need to stay away from things with strong magnetic and electrical fields. These include MRI machines (unless your heart monitor is safe for an MRI), welding equipment, and power generators. You can use a cell phone, but keep it at least 6 inches away from your heart monitor. Your doctor or the maker of your heart monitor can give you a full list of things to avoid. When should you call for help? Call your doctor now or seek immediate medical care if:  · You have symptoms of infection, such as:  ¨ Increased pain, swelling, warmth, or redness around the cut. ¨ Red streaks leading from the cut. ¨ Pus draining from the cut. ¨ A fever. Watch closely for changes in your health, and be sure to contact your doctor if:  · You have any problems with your heart monitor. Follow-up care is a key part of your treatment and safety. Be sure to make and go to all appointments, and call your doctor if you are having problems. It's also a good idea to know your test results and keep a list of the medicines you take. Where can you learn more? Go to http://justine-brody.info/. Enter Q204 in the search box to learn more about \"Learning About Implantable Heart Monitors. \"  Current as of: September 21, 2016  Content Version: 11.4  © 3234-9421 Healthwise, Incorporated. Care instructions adapted under license by [employer] (which disclaims liability or warranty for this information). If you have questions about a medical condition or this instruction, always ask your healthcare professional. [place] [address] any warranty or liability for your use of this information.

## 2017-11-13 NOTE — LETTER
11/13/2017 10:05 AM 
 
Ms. Rogelio Bolanos [address] [place] [id number] You are scheduled for a implantable cardiac monitor at [facility] on Wednesday, December 6th. Please arrive at the patient registration located on the 2nd floor of the main building at 11:00 am. 
 
Do not eat or drink anything after midnight the night before your procedure. You will need a . You may take your normal medications with a sip of water the morning of your procedure. Lab instructions: Please have labs drawn 5-10 days prior to scheduled procedure. Please call Gelacio Elise or Dc Mcmillan if you have any questions at [phone]. Please call the office if you develop any type of illness prior to your procedure. Sincerely, Raj Benoit MD/Fatimah Perkins

## 2017-11-13 NOTE — PROGRESS NOTES
HISTORY OF PRESENTING ILLNESS      Jany Ortiz is a 65 y.o. female with history of dyslipidemia, GERD and ventricular preexcitation since 1999 who previously experienced palpitations sparingly however has recently observed an increase in frequency and duration with episodes occurring once per week and lasting on average about 5 minutes per episode. She reports palpitations with occasional associated shortness of breath. She denies syncope/presyncope. EKG shows sinus rhythm with ventricular preexcitation. She is also experienced chest discomfort for which she was arrange for an echocardiogram and nuclear stress test on 11/14/2017.        ACTIVE PROBLEM LIST     Patient Active Problem List    Diagnosis Date Noted    Chest pain 09/28/2017    Palpitations 09/28/2017    Giulia-Parkinson-White syndrome 09/28/2017           PAST MEDICAL HISTORY     Past Medical History:   Diagnosis Date    Depression     gerd     Hypercholesteremia     Giulia-Parkinson-White (WPW) syndrome            PAST SURGICAL HISTORY     Past Surgical History:   Procedure Laterality Date    HX APPENDECTOMY      HX HYSTERECTOMY            ALLERGIES     No Known Allergies       FAMILY HISTORY     Family History   Problem Relation Age of Onset    Coronary Artery Disease Mother      Stent    negative for cardiac disease       SOCIAL HISTORY     Social History     Social History    Marital status:      Spouse name: N/A    Number of children: N/A    Years of education: N/A     Social History Main Topics    Smoking status: Former Smoker    Smokeless tobacco: Never Used      Comment: Quit 2 months ago; 40+ years of smoking    Alcohol use 1.2 oz/week     2 Glasses of wine per week    Drug use: No    Sexual activity: Not Asked     Other Topics Concern    None     Social History Narrative         MEDICATIONS     Current Outpatient Prescriptions   Medication Sig    MULTIVITAMIN WITH IRON (HAIR VITAMINS PO) Take 2 Tabs by mouth daily.    cyclobenzaprine (FLEXERIL) 10 mg tablet Take  by mouth as needed for Muscle Spasm(s).  simvastatin (ZOCOR) 20 mg tablet Take 20 mg by mouth nightly.  citalopram (CELEXA) 20 mg tablet Take 20 mg by mouth daily.  lansoprazole (PREVACID SOLUTAB) 30 mg disintegrating tablet Take 30 mg by mouth daily.  multivitamin (ONE A DAY) tablet Take 1 Tab by mouth daily.  calcium-cholecalciferol, D3, (CALTRATE 600+D) tablet Take 1 Tab by mouth daily. No current facility-administered medications for this visit. I have reviewed the nurses notes, vitals, problem list, allergy list, medical history, family, social history and medications. REVIEW OF SYMPTOMS      General: Pt denies excessive weight gain or loss. Pt is able to conduct ADL's  HEENT: Denies blurred vision, headaches, hearing loss, epistaxis and difficulty swallowing. Respiratory: Denies cough, congestion, shortness of breath, FOX, wheezing or stridor. Cardiovascular: Denies precordial pain, palpitations, edema or PND  Gastrointestinal: Denies poor appetite, indigestion, abdominal pain or blood in stool  Genitourinary: Denies hematuria, dysuria, increased urinary frequency  Musculoskeletal: Denies joint pain or swelling from muscles or joints  Neurologic: Denies tremor, paresthesias, headache, or sensory motor disturbance  Psychiatric: Denies confusion, insomnia, depression  Integumentray: Denies rash, itching or ulcers. Hematologic: Denies easy bruising, bleeding     PHYSICAL EXAMINATION      Vitals:    11/13/17 0913   BP: 126/82   Pulse: 80   Resp: 16   SpO2: 98%   Weight: 151 lb 9.6 oz (68.8 kg)   Height: 5' 2\" (1.575 m)     General: Well developed, in no acute distress. HEENT: No jaundice, oral mucosa moist, no oral ulcers  Neck: Supple, no stiffness, no lymphadenopathy, supple  Heart:  Normal S1/S2 negative S3 or S4.  Regular, no murmur, gallop or rub, no jugular venous distention  Respiratory: Clear bilaterally x 4, no wheezing or rales  Abdomen:   Soft, non-tender, bowel sounds are active.   Extremities:  No edema, normal cap refill, no cyanosis. Musculoskeletal: No clubbing, no deformities  Neuro: A&Ox3, speech clear, gait stable, cooperative, no focal neurologic deficits  Skin: Skin color is normal. No rashes or lesions. Non diaphoretic, moist.  Vascular: 2+ pulses symmetric in all extremities       DIAGNOSTIC DATA      EKG: Sinus rhythm with ventricular preexcitation     LABORATORY DATA      Lab Results   Component Value Date/Time    WBC 7.5 09/15/2017 11:17 AM    HGB 16.2 09/15/2017 11:17 AM    HCT 48.0 09/15/2017 11:17 AM    PLATELET 116 58/63/1187 11:17 AM    MCV 95.2 09/15/2017 11:17 AM      Lab Results   Component Value Date/Time    Sodium 138 09/15/2017 11:17 AM    Potassium 4.1 09/15/2017 11:17 AM    Chloride 103 09/15/2017 11:17 AM    CO2 33 09/15/2017 11:17 AM    Anion gap 2 09/15/2017 11:17 AM    Glucose 93 09/15/2017 11:17 AM    BUN 11 09/15/2017 11:17 AM    Creatinine 0.85 09/15/2017 11:17 AM    BUN/Creatinine ratio 13 09/15/2017 11:17 AM    GFR est AA >60 09/15/2017 11:17 AM    GFR est non-AA >60 09/15/2017 11:17 AM    Calcium 8.8 09/15/2017 11:17 AM    Bilirubin, total 0.4 09/15/2017 11:17 AM    AST (SGOT) 27 09/15/2017 11:17 AM    Alk. phosphatase 88 09/15/2017 11:17 AM    Protein, total 7.2 09/15/2017 11:17 AM    Albumin 3.8 09/15/2017 11:17 AM    Globulin 3.4 09/15/2017 11:17 AM    A-G Ratio 1.1 09/15/2017 11:17 AM    ALT (SGPT) 45 09/15/2017 11:17 AM           ASSESSMENT      1. Ventricular preexcitation  2. Palpitations  3. Chest pain       PLAN     Agree with echocardiogram and nuclear stress test for further evaluation of chest pain. We will also arrange for 30 day monitor to further evaluate whether it arrhythmic etiology is underlying her palpitations. Discussed options of drug therapy versus SVT ablation should she be found to have an SVT.   She will consider her options and notify us of her decision. FOLLOW-UP     1 month      Thank you,  Charleen Isaac MD and Dr. Danielito Vang for involving me in the care of this extraordinarily pleasant female. Please do not hesitate to contact me for further questions/concerns.          Kayli Mackenzie MD  Cardiac Electrophysiology / Cardiology    [facility] [address]  [address] [facility], [facility], [address] [place] [address]  [phone] / [phone] Fax   [phone] / [phone] Fax

## 2017-11-13 NOTE — PROGRESS NOTES
Visit Vitals    /82 (BP 1 Location: Left arm, BP Patient Position: Sitting)    Pulse 80    Resp 16    Ht 5' 2\" (1.575 m)    Wt 151 lb 9.6 oz (68.8 kg)    SpO2 98%    BMI 27.73 kg/m2

## 2017-11-14 ENCOUNTER — CLINICAL SUPPORT (OUTPATIENT)
Dept: CARDIOLOGY CLINIC | Age: 57
End: 2017-11-14

## 2017-11-14 ENCOUNTER — OFFICE VISIT (OUTPATIENT)
Dept: CARDIOLOGY CLINIC | Age: 57
End: 2017-11-14

## 2017-11-14 DIAGNOSIS — R07.9 CHEST PAIN, UNSPECIFIED TYPE: ICD-10-CM

## 2017-11-14 DIAGNOSIS — R00.2 PALPITATIONS: ICD-10-CM

## 2017-11-14 DIAGNOSIS — I45.6 WOLFF-PARKINSON-WHITE SYNDROME: ICD-10-CM

## 2017-11-14 DIAGNOSIS — R07.9 CHEST PAIN, UNSPECIFIED TYPE: Primary | ICD-10-CM

## 2017-11-14 DIAGNOSIS — R00.2 PALPITATIONS: Primary | ICD-10-CM

## 2017-11-14 DIAGNOSIS — I45.6 WOLFF-PARKINSON-WHITE SYNDROME: Primary | ICD-10-CM

## 2017-11-14 NOTE — PROGRESS NOTES
Explained procedure to patient, Obtaining IV access, radiation exposure, risks and discomforts (for exercise stress test), waiting between injections and obtaining images. All concerns and questions addressed, prior to obtaining consent. See scanned report. Dr. Marcella Andrade ordered and Dr. Marcella Andrade read study. ID verified per protocol. Pt  reported no symptoms at completion of protocol.

## 2017-11-20 ENCOUNTER — TELEPHONE (OUTPATIENT)
Dept: CARDIOLOGY CLINIC | Age: 57
End: 2017-11-20

## 2017-11-20 NOTE — TELEPHONE ENCOUNTER
Spoke with  nurse-advised to place E-cardio first and then will proceed from there.  Pt verbalized understanding

## 2017-11-20 NOTE — TELEPHONE ENCOUNTER
Patient said she got a heart monitor in the mail Saturday, but she got a letter in the mail the next day stating she will have a heart monitor implanted on December 6th so shes just a little confused. Please give her a call to help clear things up, thanks!    Phone: [phone]

## 2017-12-05 ENCOUNTER — DOCUMENTATION ONLY (OUTPATIENT)
Dept: CARDIOLOGY CLINIC | Age: 57
End: 2017-12-05

## 2021-03-29 ENCOUNTER — TRANSCRIBE ORDER (OUTPATIENT)
Dept: SCHEDULING | Age: 61
End: 2021-03-29

## 2021-03-29 DIAGNOSIS — M81.0 OSTEOPOROSIS: ICD-10-CM

## 2021-03-29 DIAGNOSIS — Z12.31 VISIT FOR SCREENING MAMMOGRAM: Primary | ICD-10-CM

## 2022-02-22 ENCOUNTER — TRANSCRIBE ORDER (OUTPATIENT)
Dept: SCHEDULING | Age: 62
End: 2022-02-22

## 2022-02-22 DIAGNOSIS — Z12.31 VISIT FOR SCREENING MAMMOGRAM: Primary | ICD-10-CM

## 2022-03-03 ENCOUNTER — HOSPITAL ENCOUNTER (OUTPATIENT)
Dept: MAMMOGRAPHY | Age: 62
Discharge: HOME | End: 2022-03-03
Attending: FAMILY MEDICINE
Payer: COMMERCIAL

## 2022-03-03 DIAGNOSIS — Z12.31 VISIT FOR SCREENING MAMMOGRAM: ICD-10-CM

## 2022-03-03 PROCEDURE — 77063 BREAST TOMOSYNTHESIS BI: CPT

## 2022-03-19 PROBLEM — I45.6 WOLFF-PARKINSON-WHITE SYNDROME: Status: ACTIVE | Noted: 2017-09-28

## 2022-03-20 PROBLEM — R00.2 PALPITATIONS: Status: ACTIVE | Noted: 2017-09-28

## 2022-03-20 PROBLEM — R07.9 CHEST PAIN: Status: ACTIVE | Noted: 2017-09-28

## 2023-07-31 ENCOUNTER — TRANSCRIBE ORDERS (OUTPATIENT)
Facility: HOSPITAL | Age: 63
End: 2023-07-31

## 2023-07-31 DIAGNOSIS — Z12.31 VISIT FOR SCREENING MAMMOGRAM: Primary | ICD-10-CM

## 2023-08-18 ENCOUNTER — OFFICE VISIT (OUTPATIENT)
Age: 63
End: 2023-08-18
Payer: COMMERCIAL

## 2023-08-18 VITALS
HEIGHT: 61 IN | BODY MASS INDEX: 27.75 KG/M2 | DIASTOLIC BLOOD PRESSURE: 90 MMHG | WEIGHT: 147 LBS | HEART RATE: 96 BPM | SYSTOLIC BLOOD PRESSURE: 120 MMHG | OXYGEN SATURATION: 99 %

## 2023-08-18 DIAGNOSIS — Z76.89 ENCOUNTER TO ESTABLISH CARE: ICD-10-CM

## 2023-08-18 DIAGNOSIS — I45.6 WOLFF-PARKINSON-WHITE SYNDROME: Primary | ICD-10-CM

## 2023-08-18 DIAGNOSIS — R00.2 PALPITATIONS: ICD-10-CM

## 2023-08-18 PROCEDURE — 93000 ELECTROCARDIOGRAM COMPLETE: CPT | Performed by: STUDENT IN AN ORGANIZED HEALTH CARE EDUCATION/TRAINING PROGRAM

## 2023-08-18 PROCEDURE — 99204 OFFICE O/P NEW MOD 45 MIN: CPT | Performed by: STUDENT IN AN ORGANIZED HEALTH CARE EDUCATION/TRAINING PROGRAM

## 2023-08-18 RX ORDER — LANSOPRAZOLE 30 MG/1
CAPSULE, DELAYED RELEASE ORAL
COMMUNITY
Start: 2023-07-30

## 2023-08-18 RX ORDER — M-VIT,TX,IRON,MINS/CALC/FOLIC 27MG-0.4MG
1 TABLET ORAL DAILY
COMMUNITY

## 2023-08-18 RX ORDER — CYCLOBENZAPRINE HCL 10 MG
TABLET ORAL
COMMUNITY
Start: 2023-07-03

## 2023-08-18 ASSESSMENT — PATIENT HEALTH QUESTIONNAIRE - PHQ9
1. LITTLE INTEREST OR PLEASURE IN DOING THINGS: 0
SUM OF ALL RESPONSES TO PHQ QUESTIONS 1-9: 0
SUM OF ALL RESPONSES TO PHQ QUESTIONS 1-9: 0
2. FEELING DOWN, DEPRESSED OR HOPELESS: 0
SUM OF ALL RESPONSES TO PHQ9 QUESTIONS 1 & 2: 0
SUM OF ALL RESPONSES TO PHQ QUESTIONS 1-9: 0
SUM OF ALL RESPONSES TO PHQ QUESTIONS 1-9: 0

## 2023-08-18 NOTE — PROGRESS NOTES
Chief Complaint   Patient presents with    New Patient     Vitals:    08/18/23 0855   BP: (!) 138/100   Site: Left Upper Arm   Position: Sitting   Pulse: 96   SpO2: 99%   Weight: 147 lb (66.7 kg)   Height: 5' 1\" (1.549 m)         Chest pain denied     SOB denied     Palpitations denied     Swelling in hands/feet denied     Dizziness denied     Recent hospital stays denied     Refills denied
Refill per VO of Dr. Dipesh Mcelroy.  Lisbeth Gregg:    Last appt:  Visit date not found    Future Appointments   Date Time Provider [address]   9/1/2023 10:00 AM [facility] ECHO 3 [facility]   9/21/2023  9:45 AM [facility] GLEN 2 [facility]   10/6/2023 10:00 AM Brijesh Rogel DO [facility] AMB       Requested Prescriptions     Signed Prescriptions Disp Refills    metoprolol tartrate (LOPRESSOR) 25 MG tablet 180 tablet 3     Sig: Take 1 tablet by mouth 2 times daily
sinus rhythm, no ventricular preexcitation noted          Investigations reviewed     Echo 11/20/2017 normal LV function no significant valvular pathology                    ATTENTION:   This medical record was transcribed using an electronic medical records/speech recognition system. Although proofread, it may and can contain electronic, spelling and other errors. Corrections may be executed at a later time. Please feel free to contact us for any clarifications as needed.

## 2023-08-21 ENCOUNTER — TELEPHONE (OUTPATIENT)
Age: 63
End: 2023-08-21

## 2023-08-21 NOTE — TELEPHONE ENCOUNTER
----- Message from Fior De Anda RN sent at 2023  9:37 AM EDT -----  Regardin daymonitor  Per Dr. Allison Lopez:  14 day monitor for [place]  Thanks

## 2023-09-12 ENCOUNTER — TELEPHONE (OUTPATIENT)
Age: 63
End: 2023-09-12

## 2023-09-21 ENCOUNTER — HOSPITAL ENCOUNTER (OUTPATIENT)
Facility: HOSPITAL | Age: 63
Discharge: HOME | End: 2023-09-21
Payer: COMMERCIAL

## 2023-09-21 VITALS — HEIGHT: 61 IN | BODY MASS INDEX: 27.75 KG/M2 | WEIGHT: 147 LBS

## 2023-09-21 DIAGNOSIS — Z12.31 VISIT FOR SCREENING MAMMOGRAM: ICD-10-CM

## 2023-09-21 PROCEDURE — 77063 BREAST TOMOSYNTHESIS BI: CPT

## 2023-10-06 ENCOUNTER — OFFICE VISIT (OUTPATIENT)
Age: 63
End: 2023-10-06
Payer: COMMERCIAL

## 2023-10-06 VITALS
HEART RATE: 73 BPM | DIASTOLIC BLOOD PRESSURE: 80 MMHG | SYSTOLIC BLOOD PRESSURE: 126 MMHG | BODY MASS INDEX: 28.15 KG/M2 | OXYGEN SATURATION: 98 % | WEIGHT: 149 LBS

## 2023-10-06 DIAGNOSIS — I45.6 WPW (WOLFF-PARKINSON-WHITE SYNDROME): ICD-10-CM

## 2023-10-06 DIAGNOSIS — R00.2 PALPITATIONS: Primary | ICD-10-CM

## 2023-10-06 PROCEDURE — 99214 OFFICE O/P EST MOD 30 MIN: CPT | Performed by: STUDENT IN AN ORGANIZED HEALTH CARE EDUCATION/TRAINING PROGRAM

## 2023-10-06 RX ORDER — CALCIUM CARBONATE 500(1250)
500 TABLET ORAL DAILY
COMMUNITY

## 2023-10-11 ENCOUNTER — HOSPITAL ENCOUNTER (OUTPATIENT)
Facility: HOSPITAL | Age: 63
Discharge: HOME | End: 2023-10-14
Payer: COMMERCIAL

## 2023-10-11 DIAGNOSIS — R92.8 ABNORMALITY OF RIGHT BREAST ON SCREENING MAMMOGRAM: ICD-10-CM

## 2023-10-11 PROCEDURE — G0279 TOMOSYNTHESIS, MAMMO: HCPCS

## 2023-10-11 PROCEDURE — 76642 ULTRASOUND BREAST LIMITED: CPT

## 2024-05-16 NOTE — TELEPHONE ENCOUNTER
Refill per VO of Dr. MAURICE Lake, OD:    Last appt:  10/6/2023    Future Appointments   Date Time Provider Department Center   6/11/2024  4:00 PM Gustavo Lake DO [facility] AMB       Requested Prescriptions     Pending Prescriptions Disp Refills    metoprolol tartrate (LOPRESSOR) 25 MG tablet [Pharmacy Med Name: METOPROLOL TARTRATE 25MG TABLETS] 180 tablet 3     Sig: TAKE 1 TABLET BY MOUTH TWICE DAILY

## 2024-06-11 ENCOUNTER — OFFICE VISIT (OUTPATIENT)
Age: 64
End: 2024-06-11
Payer: COMMERCIAL

## 2024-06-11 VITALS
BODY MASS INDEX: 28.51 KG/M2 | WEIGHT: 151 LBS | HEART RATE: 90 BPM | RESPIRATION RATE: 18 BRPM | DIASTOLIC BLOOD PRESSURE: 80 MMHG | HEIGHT: 61 IN | SYSTOLIC BLOOD PRESSURE: 118 MMHG | OXYGEN SATURATION: 97 %

## 2024-06-11 DIAGNOSIS — R00.2 PALPITATIONS: Primary | ICD-10-CM

## 2024-06-11 PROCEDURE — 99213 OFFICE O/P EST LOW 20 MIN: CPT | Performed by: STUDENT IN AN ORGANIZED HEALTH CARE EDUCATION/TRAINING PROGRAM

## 2024-06-11 NOTE — PROGRESS NOTES
[place]  [address] [address], [address]  [place] [address]    Office [phone],Fax [phone]           Cheryl Sharif is a 64 y.o. female presents the office for evaluation of palpitations      Assessment/Recommendations:     Diagnosis Orders   1. Palpitations              Hx of ventricular preexcitation, EKG 8/2023 did not show delta wave.  Recent event monitor with 4 beats of SVT, no concerning dysrhythmia    Palpitations- significantly improved with BB therapy.    - continue metoprolol tartrate 25mg bid  - pt requesting carotid artery screening, will order        Primary Care Physician- Gina Lyon PA-C    Follow-up 1 year        Subjective:  Palpitations doing very well with metoprolol.  Feels heart pounding when she attempts to sleep if she misses BB dose.  No chest pain, dyspnea on exertion, shortness of breath, orthopnea, PND          Past Medical History:   Diagnosis Date    Depression     Gastrointestinal disorder     Hypercholesteremia     Renny-Parkinson-White (WPW) syndrome         Past Surgical History:   Procedure Laterality Date    APPENDECTOMY      CYST INCISION AND DRAINAGE Right     30+yrs ago    HYSTERECTOMY (CERVIX STATUS UNKNOWN)      2004         Current Outpatient Medications:     ASHWAGANDHA PO, Take by mouth daily, Disp: , Rfl:     NONFORMULARY, Nutra fall for hair growth, Disp: , Rfl:     metoprolol tartrate (LOPRESSOR) 25 MG tablet, TAKE 1 TABLET BY MOUTH TWICE DAILY, Disp: 180 tablet, Rfl: 3    calcium carbonate (OSCAL) 500 MG TABS tablet, Take 1 tablet by mouth daily, Disp: , Rfl:     lansoprazole (PREVACID) 30 MG delayed release capsule, Take 1 capsule by mouth daily, Disp: , Rfl:     cyclobenzaprine (FLEXERIL) 10 MG tablet, , Disp: , Rfl:     Multiple Vitamins-Minerals (THERAPEUTIC MULTIVITAMIN-MINERALS) tablet, Take 1 tablet by mouth daily, Disp: , Rfl:     No Known Allergies     Family History   Problem Relation Age of Onset

## 2024-06-11 NOTE — PROGRESS NOTES
had concerns including Palpitations and Other (wpw).    Vitals:    06/11/24 1550   BP: 118/80   Site: Left Upper Arm   Position: Sitting   Pulse: 90   Resp: 18   SpO2: 97%   Weight: 68.5 kg (151 lb)   Height: 1.549 m (5' 1\")      Patient has questions about having vascular testing done    Chest pain No    Refills No        1. Have you been to the ER, urgent care clinic since your last visit? No       Hospitalized since your last visit? No       Where?        Date?

## 2025-06-06 ENCOUNTER — TELEPHONE (OUTPATIENT)
Age: 65
End: 2025-06-06

## 2025-06-06 RX ORDER — METOPROLOL TARTRATE 25 MG/1
25 TABLET, FILM COATED ORAL 2 TIMES DAILY
Qty: 180 TABLET | Refills: 0 | Status: SHIPPED | OUTPATIENT
Start: 2025-06-06

## 2025-06-06 NOTE — TELEPHONE ENCOUNTER
Patient and pharmacy stated  that she needed an approval sent for the medication below      Metoprolol Tartrate 25 mg Oral 2 TIMES DAILY     Please send today pharmacy requested     [id number] [address] - P [phone] - F [phone]  [phone]

## 2025-06-06 NOTE — TELEPHONE ENCOUNTER
Refill per VO of Dr. MAURICE Lake, OD:    Last appt:  Visit date not found    Future Appointments   Date Time Provider Department Center   6/11/2025 11:40 AM Gustavo Lake DO [facility] AMB       Requested Prescriptions     Pending Prescriptions Disp Refills    metoprolol tartrate (LOPRESSOR) 25 MG tablet 180 tablet 3     Sig: Take 1 tablet by mouth 2 times daily

## 2025-06-11 ENCOUNTER — OFFICE VISIT (OUTPATIENT)
Age: 65
End: 2025-06-11
Payer: COMMERCIAL

## 2025-06-11 VITALS
HEIGHT: 61 IN | WEIGHT: 151 LBS | HEART RATE: 80 BPM | RESPIRATION RATE: 17 BRPM | DIASTOLIC BLOOD PRESSURE: 80 MMHG | SYSTOLIC BLOOD PRESSURE: 126 MMHG | BODY MASS INDEX: 28.51 KG/M2 | OXYGEN SATURATION: 97 %

## 2025-06-11 DIAGNOSIS — I45.6 WPW (WOLFF-PARKINSON-WHITE SYNDROME): ICD-10-CM

## 2025-06-11 DIAGNOSIS — R00.2 PALPITATIONS: Primary | ICD-10-CM

## 2025-06-11 DIAGNOSIS — R94.31 ABNORMAL ELECTROCARDIOGRAM: ICD-10-CM

## 2025-06-11 PROCEDURE — 99214 OFFICE O/P EST MOD 30 MIN: CPT | Performed by: STUDENT IN AN ORGANIZED HEALTH CARE EDUCATION/TRAINING PROGRAM

## 2025-06-11 PROCEDURE — 93000 ELECTROCARDIOGRAM COMPLETE: CPT | Performed by: STUDENT IN AN ORGANIZED HEALTH CARE EDUCATION/TRAINING PROGRAM

## 2025-06-11 NOTE — PROGRESS NOTES
had concerns including Palpitations.    Vitals:    06/11/25 1140   BP: 126/80   BP Site: Left Upper Arm   Patient Position: Sitting   Pulse: 80   Resp: 17   SpO2: 97%   Weight: 68.5 kg (151 lb)   Height: 1.549 m (5' 1\")        Chest pain No    Refills No        1. Have you been to the ER, urgent care clinic since your last visit? No       Hospitalized since your last visit? No       Where?        Date?

## 2025-06-11 NOTE — PROGRESS NOTES
[place]  [address] [address], [address]  [place] [address]    Office [phone],Fax [phone]           Cheryl Sharif is a 65 y.o. female presents the office for evaluation of palpitations      Assessment/Recommendations:     Diagnosis Orders   1. Palpitations  EKG 12 Lead      2. Abnormal electrocardiogram        3. WPW (Renny-Parkinson-White syndrome)              Hx of ventricular preexcitation, EKG 8/2023 did not show delta wave.  Recent event monitor with 4 beats of SVT, no concerning dysrhythmia    Hx of Palpitations- significantly improved with BB therapy.    Low voltage echocardiogram    - continue metoprolol tartrate 25mg bid  - Monitor heart rhythm with [employer] mobile  - Echocardiogram        Primary Care Physician- Gina Lyon PA-C    Follow-up 1 year        Subjective:    Continues to have intermittent intervals where her heart races for a few minutes.  Usually resolves with coughing.  No ongoing exertional dyspnea or angina.          Past Medical History:   Diagnosis Date    Depression     Gastrointestinal disorder     Hypercholesteremia     Renny-Parkinson-White (WPW) syndrome         Past Surgical History:   Procedure Laterality Date    APPENDECTOMY      CYST INCISION AND DRAINAGE Right     30+yrs ago    HYSTERECTOMY (CERVIX STATUS UNKNOWN)      2004         Current Outpatient Medications:     metoprolol tartrate (LOPRESSOR) 25 MG tablet, Take 1 tablet by mouth 2 times daily, Disp: 180 tablet, Rfl: 0    NONFORMULARY, Nutra fall for hair growth, Disp: , Rfl:     lansoprazole (PREVACID) 30 MG delayed release capsule, Take 1 capsule by mouth daily, Disp: , Rfl:     cyclobenzaprine (FLEXERIL) 10 MG tablet, , Disp: , Rfl:     Multiple Vitamins-Minerals (THERAPEUTIC MULTIVITAMIN-MINERALS) tablet, Take 1 tablet by mouth daily, Disp: , Rfl:     ASHWAGANDHA PO, Take by mouth daily (Patient not taking: Reported on 6/11/2025), Disp: , Rfl:     calcium

## 2025-09-02 RX ORDER — METOPROLOL TARTRATE 25 MG/1
25 TABLET, FILM COATED ORAL 2 TIMES DAILY
Qty: 180 TABLET | Refills: 2 | Status: SHIPPED | OUTPATIENT
Start: 2025-09-02